# Patient Record
Sex: MALE | Race: WHITE | NOT HISPANIC OR LATINO | Employment: FULL TIME | URBAN - METROPOLITAN AREA
[De-identification: names, ages, dates, MRNs, and addresses within clinical notes are randomized per-mention and may not be internally consistent; named-entity substitution may affect disease eponyms.]

---

## 2017-06-21 ENCOUNTER — NON-PROVIDER VISIT (OUTPATIENT)
Dept: OCCUPATIONAL MEDICINE | Facility: CLINIC | Age: 52
End: 2017-06-21

## 2017-06-21 ENCOUNTER — OFFICE VISIT (OUTPATIENT)
Dept: OCCUPATIONAL MEDICINE | Facility: CLINIC | Age: 52
End: 2017-06-21

## 2017-06-21 VITALS
BODY MASS INDEX: 30.51 KG/M2 | OXYGEN SATURATION: 94 % | DIASTOLIC BLOOD PRESSURE: 84 MMHG | WEIGHT: 206 LBS | HEART RATE: 72 BPM | TEMPERATURE: 98.2 F | RESPIRATION RATE: 16 BRPM | SYSTOLIC BLOOD PRESSURE: 118 MMHG | HEIGHT: 69 IN

## 2017-06-21 DIAGNOSIS — Z02.1 PRE-EMPLOYMENT HEALTH SCREENING EXAMINATION: ICD-10-CM

## 2017-06-21 DIAGNOSIS — Z01.89 RESPIRATORY CLEARANCE EXAMINATION, ENCOUNTER FOR: ICD-10-CM

## 2017-06-21 PROCEDURE — 94010 BREATHING CAPACITY TEST: CPT | Performed by: PREVENTIVE MEDICINE

## 2017-06-21 PROCEDURE — 92553 AUDIOMETRY AIR & BONE: CPT | Performed by: PREVENTIVE MEDICINE

## 2017-06-21 PROCEDURE — 94375 RESPIRATORY FLOW VOLUME LOOP: CPT | Performed by: PREVENTIVE MEDICINE

## 2017-06-21 NOTE — MR AVS SNAPSHOT
Johan Vázquez   2017 9:40 AM   Office Visit   MRN: [id number]    Department:  [facility]   Dept Phone:  [phone]    Description:  Male : 1965   Provider:  Vipin Valiente D.O.           Allergies as of 2017     Allergen Noted Reactions    Latex 2013         You were diagnosed with     Respiratory clearance examination, encounter for   [[id number]]         Vital Signs     Smoking Status                   Former Smoker           Basic Information     Date Of Birth Sex Race Ethnicity Preferred Language    1965 Male White Non- English      Problem List              ICD-10-CM Priority Class Noted - Resolved    Herpes genitalia A60.00   2013 - Present      Health Maintenance        Date Due Completion Dates    IMM DTaP/Tdap/Td Vaccine (1 - Tdap) 1983 ---    COLONOSCOPY 2026            Current Immunizations     No immunizations on file.      Below and/or attached are the medications your provider expects you to take. Review all of your home medications and newly ordered medications with your provider and/or pharmacist. Follow medication instructions as directed by your provider and/or pharmacist. Please keep your medication list with you and share with your provider. Update the information when medications are discontinued, doses are changed, or new medications (including over-the-counter products) are added; and carry medication information at all times in the event of emergency situations     Allergies:  LATEX - (reactions not documented)               Medications  Valid as of: 2017 - 10:22 AM    Generic Name Brand Name Tablet Size Instructions for use    ValACYclovir HCl (Tab) VALTREX 1 GM Take 1 Tab by mouth every day.        .                 Medicines prescribed today were sent to:     [id number] - [place], [place] - [address]    [address] [place] [address]    Phone: [phone] Fax:  [phone]    Open 24 Hours?: No      Medication refill instructions:       If your prescription bottle indicates you have medication refills left, it is not necessary to call your provider’s office. Please contact your pharmacy and they will refill your medication.    If your prescription bottle indicates you do not have any refills left, you may request refills at any time through one of the following ways: The online SoWeTrip system (except Urgent Care), by calling your provider’s office, or by asking your pharmacy to contact your provider’s office with a refill request. Medication refills are processed only during regular business hours and may not be available until the next business day. Your provider may request additional information or to have a follow-up visit with you prior to refilling your medication.   *Please Note: Medication refills are assigned a new Rx number when refilled electronically. Your pharmacy may indicate that no refills were authorized even though a new prescription for the same medication is available at the pharmacy. Please request the medicine by name with the pharmacy before contacting your provider for a refill.           SoWeTrip Access Code: Activation code not generated  Current SoWeTrip Status: Active

## 2017-10-16 ENCOUNTER — IMMUNIZATION (OUTPATIENT)
Dept: OCCUPATIONAL MEDICINE | Facility: CLINIC | Age: 52
End: 2017-10-16

## 2017-10-16 DIAGNOSIS — Z23 NEED FOR VACCINATION: ICD-10-CM

## 2017-10-16 PROCEDURE — 90686 IIV4 VACC NO PRSV 0.5 ML IM: CPT | Performed by: PREVENTIVE MEDICINE

## 2018-01-15 ENCOUNTER — HOSPITAL ENCOUNTER (EMERGENCY)
Facility: MEDICAL CENTER | Age: 53
End: 2018-01-15
Attending: EMERGENCY MEDICINE
Payer: COMMERCIAL

## 2018-01-15 ENCOUNTER — APPOINTMENT (OUTPATIENT)
Dept: RADIOLOGY | Facility: MEDICAL CENTER | Age: 53
End: 2018-01-15
Attending: EMERGENCY MEDICINE
Payer: COMMERCIAL

## 2018-01-15 VITALS
HEART RATE: 72 BPM | TEMPERATURE: 96.7 F | SYSTOLIC BLOOD PRESSURE: 137 MMHG | BODY MASS INDEX: 31.31 KG/M2 | RESPIRATION RATE: 18 BRPM | WEIGHT: 211.42 LBS | HEIGHT: 69 IN | OXYGEN SATURATION: 96 % | DIASTOLIC BLOOD PRESSURE: 78 MMHG

## 2018-01-15 DIAGNOSIS — S61.312A LACERATION OF RIGHT MIDDLE FINGER WITHOUT FOREIGN BODY WITH DAMAGE TO NAIL, INITIAL ENCOUNTER: ICD-10-CM

## 2018-01-15 DIAGNOSIS — S62.662B OPEN NONDISPLACED FRACTURE OF DISTAL PHALANX OF RIGHT MIDDLE FINGER, INITIAL ENCOUNTER: ICD-10-CM

## 2018-01-15 PROCEDURE — 90715 TDAP VACCINE 7 YRS/> IM: CPT | Performed by: EMERGENCY MEDICINE

## 2018-01-15 PROCEDURE — 303747 HCHG EXTRA SUTURE

## 2018-01-15 PROCEDURE — 73130 X-RAY EXAM OF HAND: CPT | Mod: RT

## 2018-01-15 PROCEDURE — 90471 IMMUNIZATION ADMIN: CPT

## 2018-01-15 PROCEDURE — 303485 HCHG DRESSING MEDIUM

## 2018-01-15 PROCEDURE — 304217 HCHG IRRIGATION SYSTEM

## 2018-01-15 PROCEDURE — 99284 EMERGENCY DEPT VISIT MOD MDM: CPT

## 2018-01-15 PROCEDURE — 700111 HCHG RX REV CODE 636 W/ 250 OVERRIDE (IP): Performed by: EMERGENCY MEDICINE

## 2018-01-15 PROCEDURE — 304999 HCHG REPAIR-SIMPLE/INTERMED LEVEL 1

## 2018-01-15 RX ORDER — KETOROLAC TROMETHAMINE 10 MG/1
10 TABLET, FILM COATED ORAL EVERY 6 HOURS PRN
Qty: 22 TAB | Refills: 2 | Status: SHIPPED | OUTPATIENT
Start: 2018-01-15

## 2018-01-15 RX ADMIN — CLOSTRIDIUM TETANI TOXOID ANTIGEN (FORMALDEHYDE INACTIVATED), CORYNEBACTERIUM DIPHTHERIAE TOXOID ANTIGEN (FORMALDEHYDE INACTIVATED), BORDETELLA PERTUSSIS TOXOID ANTIGEN (GLUTARALDEHYDE INACTIVATED), BORDETELLA PERTUSSIS FILAMENTOUS HEMAGGLUTININ ANTIGEN (FORMALDEHYDE INACTIVATED), BORDETELLA PERTUSSIS PERTACTIN ANTIGEN, AND BORDETELLA PERTUSSIS FIMBRIAE 2/3 ANTIGEN 0.5 ML: 5; 2; 2.5; 5; 3; 5 INJECTION, SUSPENSION INTRAMUSCULAR at 08:15

## 2018-01-15 ASSESSMENT — PAIN SCALES - GENERAL: PAINLEVEL_OUTOF10: 5

## 2018-01-15 NOTE — DISCHARGE INSTRUCTIONS
Finger Fracture  Fractures of fingers are breaks in the bones of the fingers. There are many types of fractures. There are different ways of treating these fractures. Your health care provider will discuss the best way to treat your fracture.  CAUSES  Traumatic injury is the main cause of broken fingers. These include:  · Injuries while playing sports.  · Workplace injuries.  · Falls.  RISK FACTORS  Activities that can increase your risk of finger fractures include:  · Sports.  · Workplace activities that involve machinery.  · A condition called osteoporosis, which can make your bones less dense and cause them to fracture more easily.  SIGNS AND SYMPTOMS  The main symptoms of a broken finger are pain and swelling within 15 minutes after the injury. Other symptoms include:  · Bruising of your finger.  · Stiffness of your finger.  · Numbness of your finger.  · Exposed bones (compound fracture) if the fracture is severe.  DIAGNOSIS   The best way to diagnose a broken bone is with X-ray imaging. Additionally, your health care provider will use this X-ray image to evaluate the position of the broken finger bones.   TREATMENT   Finger fractures can be treated with:   · Nonreduction--This means the bones are in place. The finger is splinted without changing the positions of the bone pieces. The splint is usually left on for about a week to 10 days. This will depend on your fracture and what your health care provider thinks.  · Closed reduction--The bones are put back into position without using surgery. The finger is then splinted.  · Open reduction and internal fixation--The fracture site is opened. Then the bone pieces are fixed into place with pins or some type of hardware. This is seldom required. It depends on the severity of the fracture.  HOME CARE INSTRUCTIONS   · Follow your health care provider's instructions regarding activities, exercises, and physical therapy.  · Only take over-the-counter or prescription  medicines for pain, discomfort, or fever as directed by your health care provider.  SEEK MEDICAL CARE IF:  You have pain or swelling that limits the motion or use of your fingers.  SEEK IMMEDIATE MEDICAL CARE IF:   Your finger becomes numb.  MAKE SURE YOU:   · Understand these instructions.  · Will watch your condition.  · Will get help right away if you are not doing well or get worse.     This information is not intended to replace advice given to you by your health care provider. Make sure you discuss any questions you have with your health care provider.     Document Released: 04/01/2002 Document Revised: 10/08/2014 Document Reviewed: 07/30/2014  [employer] Interactive Patient Education ©2016 [employer]    Fingertip Laceration  The treatment of fingertip injuries depends on how large the cut is and whether the bone or nail tissue has been damaged. Amputations of the skin over the tip of the finger that is smaller than a dime (smaller than 1cm) will usually heal very well from the sides without any treatment other than cleaning the wound and changing the dressing.  Keep your hand elevated for the next 2 to 3 days to reduce pain and swelling. A splint over the fingertip may be needed to protect your injury. If your cut is being allowed to heal in from the sides, you should soak it in warm water and change the dressing daily.   You may need a tetanus shot if:  · You cannot remember when you had your last tetanus shot.  · You have never had a tetanus shot.  · The injury broke your skin.  If you got a tetanus shot, your arm may swell, get red, and feel warm to the touch. This is common and not a problem. If you need a tetanus shot and you choose not to have one, there is a rare chance of getting tetanus. Sickness from tetanus can be serious.  SEEK MEDICAL CARE IF:   · There are any signs of infection: increased redness, swelling, and pain, or sometimes pus drainage.  Document Released: 01/25/2006 Document Revised:  03/11/2013 Document Reviewed: 01/21/2010  [employer]® Patient Information ©2014 [employer].  Return if fever, vomiting or if no better in 12 hours.

## 2018-01-15 NOTE — ED NOTES
51 y/o male ambulatory to triage with c/o partial amputation to the tip of his right middle finger after getting his finger slammed in a door.

## 2018-01-15 NOTE — LETTER
"  FORM C-4:  EMPLOYEE’S CLAIM FOR COMPENSATION/ REPORT OF INITIAL TREATMENT  EMPLOYEE’S CLAIM - PROVIDE ALL INFORMATION REQUESTED   First Name  Johan Last Syeda Vázquez Birthdate             Age  1965 52 y.o. Sex  male Claim Number   Home Employee Address  [address]   [facility]                                     Zip  [address] Height  1.753 m (5' 9\") Weight  95.9 kg (211 lb 6.7 oz) [facility]     Mailing Employee Address                           [address]    [facility]               Zip  [address] Telephone  [phone] (home)  Primary Language Spoken  ENGLISH   Insurer  [employer]   Third Party   [employer] Employee's Occupation (Job Title) When Injury or Occupational Disease Occurred  [employer]   Employer's Name  [employer] Telephone  [phone]    Employer Address  [address]] Zip  [address]   Date of Injury  1/15/2018       Hour of Injury  7:15 AM Date Employer Notified  1/15/2018 Last Day of Work after Injury or Occupational Disease  1/15/2018 Supervisor to Whom Injury Reported  Helder Rivers   Address or Location of Accident (if applicable)  [[address]]   What were you doing at the time of accident? (if applicable)  Shutting double doors to a paint mckeon    How did this injury or occupational disease occur? Be specific and answer in detail. Use additional sheet if necessary)  Shutting double doors to a paint mckeon & cut the tip of R hand middle finger   If you believe that you have an occupational disease, when did you first have knowledge of the disability and it relationship to your employment?  n/a Witnesses to the Accident  n/a     Nature of Injury or Occupational Disease  Workers' Compensation  Part(s) of Body Injured or Affected  Finger (R), N/A, N/A    I certify that the above is true and correct to the best of my " knowledge and that I have provided this information in order to obtain the benefits of [place] or [place]  I hereby authorize any physician, chiropractor, surgeon, practitioner, or other person, any hospital, including [facility] or [facility], any medical service organization, any insurance company, or other institution or organization to release to each other, any medical or other information, including benefits paid or payable, pertinent to this injury or disease, except information relative to diagnosis, treatment and/or counseling for AIDS, psychological conditions, alcohol or controlled substances, for which I must give specific authorization.  A Photostat of this authorization shall be as valid as the original.   Date  01/15/2017 [facility]   Employee’s Signature   THIS REPORT MUST BE COMPLETED AND MAILED WITHIN 3 WORKING DAYS OF TREATMENT   Place  [facility], EMERGENCY DEPT  Name of Facility   [facility]   Date  1/15/2018 Diagnosis  (S61.312A) Laceration of right middle finger without foreign body with damage to nail, initial encounter  (S62.662B) Open nondisplaced fracture of distal phalanx of right middle finger, initial encounter Is there evidence the injured employee was under the influence of alcohol and/or another controlled substance at the time of accident?   Hour  9:43 AM Description of Injury or Disease  Laceration of right middle finger without foreign body with damage to nail, initial encounter  Open nondisplaced fracture of distal phalanx of right middle finger, initial encounter No   Treatment  Fingertip laceration, open fracture laceration sutured in the emergency department  Have you advised the patient to remain off work five days or more?         No   X-Ray Findings  Positive  Comments:fractured distal portion  "of distal phalanx right middle finger   If Yes   From Date    To Date      From information given by the employee, together with medical evidence, can you directly connect this injury or occupational disease as job incurred?  Yes If No, is the employee capable of: Full Duty  No Modified Duty  Yes   Is additional medical care by a physician indicated?  No If Modified Duty, Specify any Limitations / Restrictions  Elevate right hand     Do you know of any previous injury or disease contributing to this condition or occupational disease?  No   Date  1/15/2018 Print Doctor’s Name  Genaro Negro I certify the employer’s copy of this form was mailed on:   Address  [address] [place] [address] [phone] Insurer’s Use Only   [facility]  [address]    Provider’s Tax ID Number    Telephone  Dept: [phone]    Doctor’s Signature  e-SignGENARO NEGRO M.D. Degree   M.D.    Original - TREATING PHYSICIAN OR CHIROPRACTOR   Pg 2-Insurer/TPA   Pg 3-Employer   Pg 4-Employee                                                                                                  Form C-4 (rev01/03)     BRIEF DESCRIPTION OF RIGHTS AND BENEFITS  (Pursuant to [place] 616C.050)    Notice of Injury or Occupational Disease (Incident Report Form C-1): If an injury or occupational disease (OD) arises out of and in the course of employment, you must provide written notice to your employer as soon as practicable, but no later than 7 days after the accident or OD. Your employer shall maintain a sufficient supply of the required forms.    Claim for Compensation (Form C-4): If medical treatment is sought, the form C-4 is available at the place of initial treatment. A completed \"Claim for Compensation\" (Form C-4) must be filed within 90 days after an accident or OD. The treating physician or chiropractor must, within 3 working days after treatment, complete and mail to the employer, the employer's insurer and third-party " , the Claim for Compensation.    Medical Treatment: If you require medical treatment for your on-the-job injury or OD, you may be required to select a physician or chiropractor from a list provided by your workers’ compensation insurer, if it has contracted with an Organization for Managed Care (MCO) or Preferred Provider Organization (PPO) or providers of health care. If your employer has not entered into a contract with an MCO or PPO, you may select a physician or chiropractor from the Panel of Physicians and Chiropractors. Any medical costs related to your industrial injury or OD will be paid by your insurer.    Temporary Total Disability (TTD): If your doctor has certified that you are unable to work for a period of at least 5 consecutive days, or 5 cumulative days in a 20-day period, or places restrictions on you that your employer does not accommodate, you may be entitled to TTD compensation.    Temporary Partial Disability (TPD): If the wage you receive upon reemployment is less than the compensation for TTD to which you are entitled, the insurer may be required to pay you TPD compensation to make up the difference. TPD can only be paid for a maximum of 24 months.    Permanent Partial Disability (PPD): When your medical condition is stable and there is an indication of a PPD as a result of your injury or OD, within 30 days, your insurer must arrange for an evaluation by a rating physician or chiropractor to determine the degree of your PPD. The amount of your PPD award depends on the date of injury, the results of the PPD evaluation and your age and wage.    Permanent Total Disability (PTD): If you are medically certified by a treating physician or chiropractor as permanently and totally disabled and have been granted a PTD status by your insurer, you are entitled to receive monthly benefits not to exceed 66 2/3% of your average monthly wage. The amount of your PTD payments is subject to  reduction if you previously received a PPD award.    Vocational Rehabilitation Services: You may be eligible for vocational rehabilitation services if you are unable to return to the job due to a permanent physical impairment or permanent restrictions as a result of your injury or occupational disease.    Transportation and Per Meli Reimbursement: You may be eligible for travel expenses and per meli associated with medical treatment.  Reopening: You may be able to reopen your claim if your condition worsens after claim closure.    Appeal Process: If you disagree with a written determination issued by the insurer or the insurer does not respond to your request, you may appeal to the Department of Administration, , by following the instructions contained in your determination letter. You must appeal the determination within 70 days from the date of the determination letter at [address], [address], [place], [place] [address], or [address], [place] [address]. If you disagree with the  decision, you may appeal to the Department of Administration, . You must file your appeal within 30 days from the date of the  decision letter at [address], [address], [place], [place] [address], or [address], [address], [place], [place] [address]. If you disagree with a decision of an , you may file a petition for judicial review with the District Court. You must do so within 30 days of the Appeal Officer’s decision. You may be represented by an  at your own expense or you may contact the [facility] for possible representation.    [place]  for Injured Workers (NAIW): If you disagree with a  decision, you may request that NAIW represent you without charge at an  Hearing. For information regarding denial of benefits, you may contact the [facility] at: [address],  [address], [place] [address], [phone], or [address], [address], [place] [address], [phone]    To File a Complaint with the Division: If you wish to file a complaint with the  of the Division of Industrial Relations (DIR), please contact the Workers’ Compensation Section, [address], [address], [place], [place] [address], telephone [phone]) [phone], or [address], [address], [place], [place] [address], telephone [phone].    For assistance with Workers’ Compensation Issues: you may contact the Office of the Governor Consumer Health Assistance, [address] [place] [address], [address], [place], [place] [address], Toll Free 1-531.511.3029, Web site: http://[employer].[facility]., E-mail [email or web address].[place].                                                                                                                                                                               __________________________________________________________________                                    _________________            Employee Name / Signature                                                                                                                            Date                                       D-2 (rev. 10/07)

## 2018-01-15 NOTE — ED PROVIDER NOTES
"ED Provider Note    CHIEF COMPLAINT  Chief Complaint   Patient presents with   • Amputation     partial amputation of right middle finger   • Hand Injury       HPI  Johan Vázquez is a 52 y.o. male who presents with history of accidentally slamming a door on his right middle finger, causing lacerations to the finger, no other injuries.. Injury happened at work    REVIEW OF SYSTEMS  See HPI for further details. All other systems are negative.     PAST MEDICAL HISTORY  Past Medical History:   Diagnosis Date   • Genital herpes        FAMILY HISTORY  Family History   Problem Relation Age of Onset   • Heart Disease Mother    • Arthritis Father    • Heart Disease Father    • Hypertension Maternal Grandfather    • Heart Disease Maternal Grandfather        SOCIAL HISTORY the injury occurred at work  Social History     Social History   • Marital status:      Spouse name: N/A   • Number of children: N/A   • Years of education: N/A     Social History Main Topics   • Smoking status: Former Smoker   • Smokeless tobacco: Not on file   • Alcohol use 0.5 oz/week     1 Glasses of wine per week      Comment: week   • Drug use: No   • Sexual activity: Not Currently     Other Topics Concern   • Not on file     Social History Narrative   • No narrative on file       SURGICAL HISTORY  No past surgical history on file.    CURRENT MEDICATIONS  Home Medications     Reviewed by Katlin Quiñonez R.N. (Registered Nurse) on 01/15/18 at 0753  Med List Status: Partial   Medication Last Dose Status   Celecoxib (CELEBREX PO) prn Active   valacyclovir (VALTREX) 1 GM Tab prn Active                ALLERGIES  Allergies   Allergen Reactions   • Latex Hives       PHYSICAL EXAM  VITAL SIGNS: /100   Pulse 90   Temp 35.9 °C (96.7 °F)   Resp 16   Ht 1.753 m (5' 9\")   Wt 95.9 kg (211 lb 6.7 oz)   SpO2 96%   BMI 31.22 kg/m²   Constitutional: Well developed, Well nourished, No acute distress, Non-toxic appearance.   Extremities: Intact distal " pulses, right middle finger, laceration, 2 cm tip does not appear to involve bone. There is a subungual hematoma in addition, No cyanosis, No clubbing. , Neurovascular intact, good sensation tip of finger  Musculoskeletal: Good range of motion in all major joints. No tenderness to palpation or major deformities noted.   Neurologic: Alert & oriented x 3, Normal motor function, Normal sensory function, No focal deficits noted.   Psychiatric: Affect normal, Judgment normal, Mood normal.       RADIOLOGY/PROCEDURES  DX-HAND 3+ RIGHT   Final Result      Comminuted and displaced fracture at the tip of RIGHT 3rd tuft with associated soft tissue deformity and potential involvement of the nailbed.  Probable open fracture.            COURSE & MEDICAL DECISION MAKING  Pertinent Labs & Imaging studies reviewed. (See chart for details)    He has a laceration, tip of right middle finger with comminuted fracture of the distal phalanx. The laceration is 2 cm in length. About 270° around the tip of the finger. I have sutured the laceration, he will all with Toradol, follow-up with occupational clinic. I have filled out forms for on-the-job injury  FINAL IMPRESSION  1.   1. Laceration of right middle finger without foreign body with damage to nail, initial encounter    2. Open nondisplaced fracture of distal phalanx of right middle finger, initial encounter            2.   3.     Disposition procedure note: This laceration was preped and then anesthetized with 1% Xylocaine. The laceration was thoroughly irrigated with normal saline. I was unable to find ayn foreign bodies int the wound. Any hair around the wound was shaved. The laceration was then sutured with 4-0 nylon. Neosporin was then placed around the wound and a dressing was applied.    Discharge instructions are understood. This patient is to return if fever vomiting or no better in 12 hours. Follow up with the occupational clinic associated with this hospital. Information  sheets on finger laceration on the finger fracture      Electronically signed by: Genaro Negro, 1/15/2018 8:07 AM

## 2018-01-16 ENCOUNTER — OCCUPATIONAL MEDICINE (OUTPATIENT)
Dept: OCCUPATIONAL MEDICINE | Facility: CLINIC | Age: 53
End: 2018-01-16
Payer: COMMERCIAL

## 2018-01-16 VITALS
HEIGHT: 69 IN | WEIGHT: 230 LBS | TEMPERATURE: 98.2 F | DIASTOLIC BLOOD PRESSURE: 80 MMHG | OXYGEN SATURATION: 96 % | HEART RATE: 79 BPM | BODY MASS INDEX: 34.07 KG/M2 | SYSTOLIC BLOOD PRESSURE: 128 MMHG | RESPIRATION RATE: 16 BRPM

## 2018-01-16 DIAGNOSIS — Z02.83 ENCOUNTER FOR DRUG SCREENING: ICD-10-CM

## 2018-01-16 DIAGNOSIS — S62.632D DISPLACED FRACTURE OF DISTAL PHALANX OF RIGHT MIDDLE FINGER, SUBSEQUENT ENCOUNTER FOR FRACTURE WITH ROUTINE HEALING: ICD-10-CM

## 2018-01-16 DIAGNOSIS — S61.312D LACERATION OF RIGHT MIDDLE FINGER WITHOUT FOREIGN BODY WITH DAMAGE TO NAIL, SUBSEQUENT ENCOUNTER: ICD-10-CM

## 2018-01-16 PROCEDURE — 99026 IN-HOSPITAL ON CALL SERVICE: CPT | Performed by: PREVENTIVE MEDICINE

## 2018-01-16 PROCEDURE — 99214 OFFICE O/P EST MOD 30 MIN: CPT | Performed by: PREVENTIVE MEDICINE

## 2018-01-16 PROCEDURE — 82075 ASSAY OF BREATH ETHANOL: CPT | Performed by: PREVENTIVE MEDICINE

## 2018-01-16 RX ORDER — AMOXICILLIN AND CLAVULANATE POTASSIUM 875; 125 MG/1; MG/1
1 TABLET, FILM COATED ORAL 2 TIMES DAILY
Qty: 20 QUANTITY SUFFICIENT | Refills: 0 | Status: SHIPPED | OUTPATIENT
Start: 2018-01-16

## 2018-01-16 NOTE — PROGRESS NOTES
"Subjective:      Johan Vázquez is a 52 y.o. male who presents with Follow-Up (WC DOI 1/16/18 Rt middle finger injury feeling better pr 1)      DOI 1/15/2018. Mechanism of injury-shunt door on tip of right long finger. 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 He was seen in the emergency department yesterday where sutures were placed. Pain complaints are minimal. No fevers, chills, or other constitutional symptoms.     HPI    ROS  Comprehensive medical history form reviewed. Pertinent positives and negatives included in HPI.    PFSH: reviewed in Epic    PMH:  has a past medical history of Genital herpes.  MEDS:   Current Outpatient Prescriptions:   •  amoxicillin-clavulanate (AUGMENTIN) 875-125 MG Tab, Take 1 Tab by mouth 2 times a day., Disp: 20 Quantity Sufficient, Rfl: 0  •  ketorolac (TORADOL) 10 MG Tab, Take 1 Tab by mouth every 6 hours as needed for Mild Pain for up to 22 doses., Disp: 22 Tab, Rfl: 2  •  Celecoxib (CELEBREX PO), Take  by mouth., Disp: , Rfl:   •  valacyclovir (VALTREX) 1 GM Tab, Take 1 Tab by mouth every day., Disp: 30 Tab, Rfl: 6  ALLERGIES:   Allergies   Allergen Reactions   • Latex Hives     SURGHX: No past surgical history on file.  SOCHX:  reports that he has quit smoking. He does not have any smokeless tobacco history on file. He reports that he drinks about 0.5 oz of alcohol per week . He reports that he does not use drugs.  Work Status: Employer and Job Title reviewed per [place] C4 form  FH: No pertinent hereditary disorders.        Objective:     /80   Pulse 79   Temp 36.8 °C (98.2 °F)   Resp 16   Ht 1.753 m (5' 9\")   Wt 104.3 kg (230 lb)   SpO2 96%   BMI 33.97 kg/m²      Physical Exam    Appearance: Well-developed, well-nourished.   Mental Status: Mood and Affect normal. Pleasant. Cooperative. Appropriate.   ENT: Oropharynx clear. Moist mucous membranes. Hearing normal.   Eyes: Pupils reactive. Conjunctiva normal. No scleral " icterus.   Neck: Trachea Midline. No thyromegaly. No masses.  Cardiovascular: Normal rate. Regular rhythm. Normal heart sounds.   Chest: Effort normal. Breath sounds clear.   Skin: Skin is warm and dry. No rash.   Musculoskeletal: Right long finger exam shows near circumferential laceration on the volar aspect of the digit had minimal soft tissue involvement. Although some parts appear nonviable, there is distal capillary refill that appears satisfactory. Subungual hematoma is present.         Assessment/Plan:     1. Laceration of right middle finger without foreign body with damage to nail, subsequent encounter  2. Displaced fracture of distal phalanx of right middle finger, subsequent encounter for fracture with routine healing  New to Occupational Health from emergency department  - amoxicillin-clavulanate (AUGMENTIN) 875-125 MG Tab; Take 1 Tab by mouth 2 times a day.  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

## 2018-01-16 NOTE — LETTER
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  Diagnosis: Diagnoses of Laceration of right middle finger without foreign body with damage to nail, subsequent encounter and Displaced fracture of distal phalanx of right middle finger, subsequent encounter for fracture with routine healing were pertinent to this visit.    Medical Information   Related to Industrial Injury? Yes    Subjective Complaints:  DOI 1/15/2018. Mechanism of injury-shunt door on tip of right long finger. 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 He was seen in the emergency department yesterday where sutures were placed. Pain complaints are minimal. No fevers, chills, or other constitutional symptoms.   Objective Findings: Appearance: Well-developed, well-nourished.   Mental Status: Mood and Affect normal. Pleasant. Cooperative. Appropriate.   ENT: Oropharynx clear. Moist mucous membranes. Hearing normal.   Eyes: Pupils reactive. Conjunctiva normal. No scleral icterus.   Neck: Trachea Midline. No thyromegaly. No masses.  Cardiovascular: Normal rate. Regular rhythm. Normal heart sounds.   Chest: Effort normal. Breath sounds clear.   Skin: Skin is warm and dry. No rash.   Musculoskeletal: Right long finger exam shows near circumferential laceration on the volar aspect of the digit had minimal soft tissue involvement. Although some parts appear nonviable, there is distal capillary refill that appears satisfactory. Subungual  hematoma is present.     Pre-Existing Condition(s):     Assessment:   Condition Improved    Status: Additional Care Required  Permanent Disability:No    Plan: Medication    Diagnostics:      Comments:       Disability Information   Status: Released to Full Duty    From:  1/16/2018  Through: 1/26/2018 Restrictions are:     Physical Restrictions   Sitting:    Standing:    Stooping:    Bending:      Squatting:    Walking:    Climbing:    Pushing:      Pulling:    Other:    Reaching Above Shoulder (L):   Reaching Above Shoulder (R):       Reaching Below Shoulder (L):    Reaching Below Shoulder (R):      Not to exceed Weight Limits   Carrying(hrs):   Weight Limit(lb):   Lifting(hrs):   Weight  Limit(lb):     Comments: Keep clean and dry. Splint as needed.    Repetitive Actions   Hands: i.e. Fine Manipulations from Grasping:     Feet: i.e. Operating Foot Controls:     Driving / Operate Machinery:     Physician Name: Main Dos Santos M.D. Physician Signature: MAIN Camp M.D. e-Signature: Dr. López Lopez, Medical Director   Clinic Name / Location: [address],   [address] [place] [address] Clinic Phone Number: Dept: [phone]   Appointment Time: 1:50 Pm Visit Start Time: 1:34 PM   Check-In Time:  1:27 Pm Visit Discharge Time:  2:54PM    Original-Treating Physician or Chiropractor    Page 2-Insurer/TPA    Page 3-Employer    Page 4-Employee

## 2018-01-26 ENCOUNTER — OCCUPATIONAL MEDICINE (OUTPATIENT)
Dept: OCCUPATIONAL MEDICINE | Facility: CLINIC | Age: 53
End: 2018-01-26
Payer: COMMERCIAL

## 2018-01-26 VITALS
HEIGHT: 69 IN | DIASTOLIC BLOOD PRESSURE: 92 MMHG | WEIGHT: 230 LBS | OXYGEN SATURATION: 100 % | HEART RATE: 69 BPM | RESPIRATION RATE: 14 BRPM | SYSTOLIC BLOOD PRESSURE: 130 MMHG | BODY MASS INDEX: 34.07 KG/M2

## 2018-01-26 DIAGNOSIS — S61.312D LACERATION OF RIGHT MIDDLE FINGER WITHOUT FOREIGN BODY WITH DAMAGE TO NAIL, SUBSEQUENT ENCOUNTER: ICD-10-CM

## 2018-01-26 DIAGNOSIS — S62.632D DISPLACED FRACTURE OF DISTAL PHALANX OF RIGHT MIDDLE FINGER, SUBSEQUENT ENCOUNTER FOR FRACTURE WITH ROUTINE HEALING: ICD-10-CM

## 2018-01-26 PROCEDURE — 99212 OFFICE O/P EST SF 10 MIN: CPT | Performed by: PREVENTIVE MEDICINE

## 2018-01-26 ASSESSMENT — PAIN SCALES - GENERAL: PAINLEVEL: 2=MINIMAL-SLIGHT

## 2018-01-26 NOTE — LETTER
[address],   [address] [place] [address]  Phone:  [phone] - Fax:  [phone]   [facility] Progress Report and Disability Certification  Date of Service: 1/26/2018   No Show:  No  Date / Time of Next Visit: 2/9/2018 @ 10:10 AM    Claim Information   Patient Name: Johan Vázquez  Claim Number:     Employer: [employer] OTHER SERVICES  Date of Injury: 1/15/2018     Insurer / TPA: [employer]  ID / SSN:     Occupation: [employer].  Diagnosis: Diagnoses of Laceration of right middle finger without foreign body with damage to nail, subsequent encounter and Displaced fracture of distal phalanx of right middle finger, subsequent encounter for fracture with routine healing were pertinent to this visit.    Medical Information   Related to Industrial Injury? Yes    Subjective Complaints:  DOI 1/15/2018. Mechanism of injury-shunt door on tip of right long finger. 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   Objective Findings: Right long finger shows interval healing. Some nonviable epidermis on the pad of the digit.   Pre-Existing Condition(s):     Assessment:   Condition Improved    Status: Additional Care Required  Permanent Disability:No    Plan:      Diagnostics:      Comments:       Disability Information   Status: Released to Full Duty    From:  1/26/2018  Through: 2/9/2018 Restrictions are:     Physical Restrictions   Sitting:    Standing:    Stooping:    Bending:      Squatting:    Walking:    Climbing:    Pushing:      Pulling:    Other:    Reaching Above Shoulder (L):   Reaching Above Shoulder (R):       Reaching Below Shoulder (L):    Reaching Below Shoulder (R):      Not to exceed Weight Limits   Carrying(hrs):   Weight Limit(lb):   Lifting(hrs):   Weight  Limit(lb):     Comments: Activity as tolerated    Repetitive Actions   Hands: i.e. Fine Manipulations from  Grasping:     Feet: i.e. Operating Foot Controls:     Driving / Operate Machinery:     Physician Name: Main Dos Santos M.D. Physician Signature: MAIN Camp M.D. e-Signature: Dr. López Lopez, Medical Director   Clinic Name / Location: [address],   [address]  [place] [address] Clinic Phone Number: Dept: [phone]   Appointment Time: 10:15 Am Visit Start Time: 10:34 AM   Check-In Time:  10:11 Am Visit Discharge Time:  10:53 AM    Original-Treating Physician or Chiropractor    Page 2-Insurer/TPA    Page 3-Employer    Page 4-Employee

## 2018-01-26 NOTE — PROGRESS NOTES
"Subjective:      Johan Vázquez is a 52 y.o. male who presents with Follow-Up (WC DOI 1/15/2018 - R FINGER - Better - ROOM 3)      DOI 1/15/2018. Mechanism of injury-shunt door on tip of right long finger. 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     HPI    ROS  PFSH:  WORK STATUS: Activity as tolerated  PMH:  has a past medical history of Genital herpes.  MEDS:   Current Outpatient Prescriptions:   •  amoxicillin-clavulanate (AUGMENTIN) 875-125 MG Tab, Take 1 Tab by mouth 2 times a day., Disp: 20 Quantity Sufficient, Rfl: 0  •  Celecoxib (CELEBREX PO), Take  by mouth., Disp: , Rfl:   •  ketorolac (TORADOL) 10 MG Tab, Take 1 Tab by mouth every 6 hours as needed for Mild Pain for up to 22 doses., Disp: 22 Tab, Rfl: 2  •  valacyclovir (VALTREX) 1 GM Tab, Take 1 Tab by mouth every day., Disp: 30 Tab, Rfl: 6       Objective:     /92   Pulse 69   Resp 14   Ht 1.753 m (5' 9\")   Wt 104.3 kg (230 lb)   SpO2 100%   BMI 33.97 kg/m²      Physical Exam    Right long finger shows interval healing. Some nonviable epidermis on the pad of the digit.       Assessment/Plan:     1. Laceration of right middle finger without foreign body with damage to nail, subsequent encounter  2. Displaced fracture of distal phalanx of right middle finger, subsequent encounter for fracture with routine healing  Suture removal   Activity as tolerated  Recheck in 2 weeks      "

## 2018-02-02 LAB
BREATH ALCOHOL COMMENT: NORMAL
POC BREATHALIZER: NORMAL PERCENT (ref 0–0.01)

## 2018-02-09 ENCOUNTER — OCCUPATIONAL MEDICINE (OUTPATIENT)
Dept: OCCUPATIONAL MEDICINE | Facility: CLINIC | Age: 53
End: 2018-02-09
Payer: COMMERCIAL

## 2018-02-09 VITALS
BODY MASS INDEX: 34.07 KG/M2 | TEMPERATURE: 98 F | RESPIRATION RATE: 14 BRPM | HEART RATE: 85 BPM | SYSTOLIC BLOOD PRESSURE: 124 MMHG | OXYGEN SATURATION: 100 % | DIASTOLIC BLOOD PRESSURE: 74 MMHG | HEIGHT: 69 IN | WEIGHT: 230 LBS

## 2018-02-09 DIAGNOSIS — S62.632D DISPLACED FRACTURE OF DISTAL PHALANX OF RIGHT MIDDLE FINGER, SUBSEQUENT ENCOUNTER FOR FRACTURE WITH ROUTINE HEALING: ICD-10-CM

## 2018-02-09 DIAGNOSIS — S61.312D LACERATION OF RIGHT MIDDLE FINGER WITHOUT FOREIGN BODY WITH DAMAGE TO NAIL, SUBSEQUENT ENCOUNTER: ICD-10-CM

## 2018-02-09 PROCEDURE — 99212 OFFICE O/P EST SF 10 MIN: CPT | Performed by: PREVENTIVE MEDICINE

## 2018-02-09 ASSESSMENT — PAIN SCALES - GENERAL: PAINLEVEL: 1=MINIMAL PAIN

## 2018-02-09 NOTE — LETTER
[address],   [address] [place] [address]  Phone:  [phone] - Fax:  [phone]   [facility] Progress Report and Disability Certification  Date of Service: 2/9/2018   No Show:  No  Date / Time of Next Visit: 3/9/2018@9:30AM   Claim Information   Patient Name: Johan Vázquez  Claim Number:     Employer: [employer] OTHER SERVICES  Date of Injury: 1/15/2018     Insurer / TPA: [employer]  ID / SSN:     Occupation: [employer].  Diagnosis: Diagnoses of Laceration of right middle finger without foreign body with damage to nail, subsequent encounter and Displaced fracture of distal phalanx of right middle finger, subsequent encounter for fracture with routine healing were pertinent to this visit.    Medical Information   Related to Industrial Injury? Yes    Subjective Complaints:  DOI 1/15/2018. Mechanism of injury-shunt door on tip of right long finger. 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 He has no new complaints. Minimal pain.   Objective Findings: Right long finger shows interval improvement. Evidence of new plate formation. No excessive redness or drainage.   Pre-Existing Condition(s):     Assessment:   Condition Improved    Status: Additional Care Required  Permanent Disability:No    Plan:      Diagnostics:      Comments:       Disability Information   Status: Released to Full Duty    From:  2/9/2018  Through: 3/9/2018 Restrictions are:     Physical Restrictions   Sitting:    Standing:    Stooping:    Bending:      Squatting:    Walking:    Climbing:    Pushing:      Pulling:    Other:    Reaching Above Shoulder (L):   Reaching Above Shoulder (R):       Reaching Below Shoulder (L):    Reaching Below Shoulder (R):      Not to exceed Weight Limits   Carrying(hrs):   Weight Limit(lb):   Lifting(hrs):   Weight  Limit(lb):     Comments: Activity as tolerated       Repetitive Actions   Hands: i.e. Fine Manipulations from Grasping:     Feet: i.e. Operating Foot Controls:     Driving / Operate Machinery:     Physician Name: Main Dos Santos M.D. Physician Signature: MAIN Camp M.D. e-Signature: Dr. López Lopez, Medical Director   Clinic Name / Location: [address],   [address]  [place] [address] Clinic Phone Number: Dept: [phone]   Appointment Time: 10:10 Am Visit Start Time: 10:05 AM   Check-In Time:  10:01 Am Visit Discharge Time: 10:40AM    Original-Treating Physician or Chiropractor    Page 2-Insurer/TPA    Page 3-Employer    Page 4-Employee

## 2018-02-09 NOTE — PROGRESS NOTES
"Subjective:      Johan Vázquez is a 52 y.o. male who presents with Follow-Up (WC DOI 1/15/2018 - R FINGER - )      DOI 1/15/2018. Mechanism of injury-shunt door on tip of right long finger. 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 He has no new complaints. Minimal pain.     HPI    ROS  PFSH:  WORK STATUS: Full duty  PMH:  has a past medical history of Genital herpes.  MEDS:   Current Outpatient Prescriptions:   •  amoxicillin-clavulanate (AUGMENTIN) 875-125 MG Tab, Take 1 Tab by mouth 2 times a day., Disp: 20 Quantity Sufficient, Rfl: 0  •  Celecoxib (CELEBREX PO), Take  by mouth., Disp: , Rfl:   •  ketorolac (TORADOL) 10 MG Tab, Take 1 Tab by mouth every 6 hours as needed for Mild Pain for up to 22 doses., Disp: 22 Tab, Rfl: 2  •  valacyclovir (VALTREX) 1 GM Tab, Take 1 Tab by mouth every day., Disp: 30 Tab, Rfl: 6       Objective:     /74   Pulse 85   Temp 36.7 °C (98 °F)   Resp 14   Ht 1.753 m (5' 9\")   Wt 104.3 kg (230 lb)   SpO2 100%   BMI 33.97 kg/m²      Physical Exam    Right long finger shows interval improvement. Evidence of new plate formation. No excessive redness or drainage.       Assessment/Plan:     1. Laceration of right middle finger without foreign body with damage to nail, subsequent encounter  2. Displaced fracture of distal phalanx of right middle finger, subsequent encounter for fracture with routine healing  Satisfactory interval improvement  Regular activity as tolerated  Recheck one month from discharge      "

## 2018-03-09 ENCOUNTER — OCCUPATIONAL MEDICINE (OUTPATIENT)
Dept: OCCUPATIONAL MEDICINE | Facility: CLINIC | Age: 53
End: 2018-03-09
Payer: COMMERCIAL

## 2018-03-09 VITALS
DIASTOLIC BLOOD PRESSURE: 84 MMHG | TEMPERATURE: 98.4 F | SYSTOLIC BLOOD PRESSURE: 142 MMHG | HEIGHT: 69 IN | HEART RATE: 66 BPM | BODY MASS INDEX: 34.07 KG/M2 | WEIGHT: 230 LBS | RESPIRATION RATE: 12 BRPM | OXYGEN SATURATION: 98 %

## 2018-03-09 DIAGNOSIS — S61.312D LACERATION OF RIGHT MIDDLE FINGER WITHOUT FOREIGN BODY WITH DAMAGE TO NAIL, SUBSEQUENT ENCOUNTER: ICD-10-CM

## 2018-03-09 DIAGNOSIS — S62.632D DISPLACED FRACTURE OF DISTAL PHALANX OF RIGHT MIDDLE FINGER, SUBSEQUENT ENCOUNTER FOR FRACTURE WITH ROUTINE HEALING: ICD-10-CM

## 2018-03-09 PROCEDURE — 99212 OFFICE O/P EST SF 10 MIN: CPT | Performed by: PREVENTIVE MEDICINE

## 2018-03-09 ASSESSMENT — PAIN SCALES - GENERAL: PAINLEVEL: NO PAIN

## 2018-03-09 NOTE — PROGRESS NOTES
"Subjective:      Johan Vázquez is a 52 y.o. male who presents with Follow-Up (WC DOI 1/15/2018 - R FINGER - Better- room 25)      DOI 1/15/2018. Mechanism of injury-shut door on tip of right long finger. 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 He is doing well. No pain.     HPI    ROS  PFSH:  WORK STATUS: Full duty  PMH:  has a past medical history of Genital herpes.  MEDS:   Current Outpatient Prescriptions:   •  amoxicillin-clavulanate (AUGMENTIN) 875-125 MG Tab, Take 1 Tab by mouth 2 times a day., Disp: 20 Quantity Sufficient, Rfl: 0  •  Celecoxib (CELEBREX PO), Take  by mouth., Disp: , Rfl:   •  ketorolac (TORADOL) 10 MG Tab, Take 1 Tab by mouth every 6 hours as needed for Mild Pain for up to 22 doses., Disp: 22 Tab, Rfl: 2  •  valacyclovir (VALTREX) 1 GM Tab, Take 1 Tab by mouth every day., Disp: 30 Tab, Rfl: 6       Objective:     /84   Pulse 66   Temp 36.9 °C (98.4 °F)   Resp 12   Ht 1.753 m (5' 9\")   Wt 104.3 kg (230 lb)   SpO2 98%   BMI 33.97 kg/m²      Physical Exam    Right long finger shows improvement. Nailplate healing. IP joint function normal.       Assessment/Plan:     1. Laceration of right middle finger without foreign body with damage to nail, subsequent encounter  2. Displaced fracture of distal phalanx of right middle finger, subsequent encounter for fracture with routine healing  Condition improved  Regular work  No impairment  Release from care      "

## 2018-03-09 NOTE — LETTER
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  Diagnosis: Diagnoses of Laceration of right middle finger without foreign body with damage to nail, subsequent encounter and Displaced fracture of distal phalanx of right middle finger, subsequent encounter for fracture with routine healing were pertinent to this visit.    Medical Information   Related to Industrial Injury? Yes    Subjective Complaints:  DOI 1/15/2018. Mechanism of injury-shut door on tip of right long finger. 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 He is doing well. No pain.   Objective Findings: Right long finger shows improvement. Nailplate healing. IP joint function normal.   Pre-Existing Condition(s):     Assessment:   Condition Improved    Status: Discharged /  MMI  Permanent Disability:No    Plan:      Diagnostics:      Comments:       Disability Information   Status: Released to Full Duty    From:  3/9/2018  Through:   Restrictions are:     Physical Restrictions   Sitting:    Standing:    Stooping:    Bending:      Squatting:    Walking:    Climbing:    Pushing:      Pulling:    Other:    Reaching Above Shoulder (L):   Reaching Above Shoulder (R):       Reaching Below Shoulder (L):    Reaching Below Shoulder (R):      Not to exceed Weight Limits   Carrying(hrs):   Weight Limit(lb):   Lifting(hrs):   Weight  Limit(lb):     Comments:      Repetitive Actions   Hands: i.e. Fine Manipulations from Grasping:     Feet: i.e.  Operating Foot Controls:     Driving / Operate Machinery:     Physician Name: Main Dos Santos M.D. Physician Signature: MAIN Camp M.D. e-Signature: Dr. López Lopez, Medical Director   Clinic Name / Location: [address],   [address]  [place] [address] Clinic Phone Number: Dept: [phone]   Appointment Time: 9:30 Am Visit Start Time: 9:23 AM   Check-In Time:  9:20 Am Visit Discharge Time:  9:45 AM    Original-Treating Physician or Chiropractor    Page 2-Insurer/TPA    Page 3-Employer    Page 4-Employee

## 2018-03-20 ENCOUNTER — NON-PROVIDER VISIT (OUTPATIENT)
Dept: OCCUPATIONAL MEDICINE | Facility: CLINIC | Age: 53
End: 2018-03-20

## 2018-03-20 ENCOUNTER — OFFICE VISIT (OUTPATIENT)
Dept: OCCUPATIONAL MEDICINE | Facility: CLINIC | Age: 53
End: 2018-03-20
Payer: COMMERCIAL

## 2018-03-20 DIAGNOSIS — Z02.89 ENCOUNTER FOR OCCUPATIONAL HEALTH EXAMINATION: ICD-10-CM

## 2018-03-20 PROCEDURE — 92553 AUDIOMETRY AIR & BONE: CPT | Performed by: PREVENTIVE MEDICINE

## 2018-03-20 PROCEDURE — 8919 PR LIMITED CLEARANCE: Performed by: PREVENTIVE MEDICINE

## 2018-11-07 ENCOUNTER — NON-PROVIDER VISIT (OUTPATIENT)
Dept: OCCUPATIONAL MEDICINE | Facility: CLINIC | Age: 53
End: 2018-11-07

## 2018-11-07 DIAGNOSIS — Z02.83 ENCOUNTER FOR DRUG SCREENING: ICD-10-CM

## 2018-11-07 LAB
BREATH ALCOHOL COMMENT: NORMAL
POC BREATHALIZER: 0 PERCENT (ref 0–0.01)

## 2018-11-07 PROCEDURE — 82075 ASSAY OF BREATH ETHANOL: CPT | Performed by: PREVENTIVE MEDICINE

## 2018-11-07 PROCEDURE — 80305 DRUG TEST PRSMV DIR OPT OBS: CPT | Performed by: PREVENTIVE MEDICINE

## 2018-12-01 ENCOUNTER — APPOINTMENT (OUTPATIENT)
Dept: RADIOLOGY | Facility: MEDICAL CENTER | Age: 53
End: 2018-12-01
Attending: ORTHOPAEDIC SURGERY
Payer: COMMERCIAL

## 2018-12-04 ENCOUNTER — APPOINTMENT (RX ONLY)
Dept: URBAN - METROPOLITAN AREA CLINIC 22 | Facility: CLINIC | Age: 53
Setting detail: DERMATOLOGY
End: 2018-12-04

## 2018-12-04 DIAGNOSIS — L81.4 OTHER MELANIN HYPERPIGMENTATION: ICD-10-CM

## 2018-12-04 DIAGNOSIS — L57.8 OTHER SKIN CHANGES DUE TO CHRONIC EXPOSURE TO NONIONIZING RADIATION: ICD-10-CM

## 2018-12-04 DIAGNOSIS — L91.8 OTHER HYPERTROPHIC DISORDERS OF THE SKIN: ICD-10-CM

## 2018-12-04 DIAGNOSIS — D18.0 HEMANGIOMA: ICD-10-CM

## 2018-12-04 DIAGNOSIS — Z71.89 OTHER SPECIFIED COUNSELING: ICD-10-CM

## 2018-12-04 DIAGNOSIS — L82.1 OTHER SEBORRHEIC KERATOSIS: ICD-10-CM

## 2018-12-04 DIAGNOSIS — D22 MELANOCYTIC NEVI: ICD-10-CM

## 2018-12-04 DIAGNOSIS — L72.8 OTHER FOLLICULAR CYSTS OF THE SKIN AND SUBCUTANEOUS TISSUE: ICD-10-CM

## 2018-12-04 PROBLEM — D18.01 HEMANGIOMA OF SKIN AND SUBCUTANEOUS TISSUE: Status: ACTIVE | Noted: 2018-12-04

## 2018-12-04 PROBLEM — D22.5 MELANOCYTIC NEVI OF TRUNK: Status: ACTIVE | Noted: 2018-12-04

## 2018-12-04 PROCEDURE — 99203 OFFICE O/P NEW LOW 30 MIN: CPT | Mod: 25

## 2018-12-04 PROCEDURE — 17110 DESTRUCTION B9 LES UP TO 14: CPT

## 2018-12-04 PROCEDURE — ? BENIGN DESTRUCTION

## 2018-12-04 PROCEDURE — ? COUNSELING

## 2018-12-04 ASSESSMENT — LOCATION DETAILED DESCRIPTION DERM
LOCATION DETAILED: MID POSTERIOR NECK
LOCATION DETAILED: RIGHT CLAVICULAR NECK
LOCATION DETAILED: LEFT SUPERIOR MEDIAL UPPER BACK
LOCATION DETAILED: LEFT INFERIOR LATERAL NECK
LOCATION DETAILED: LEFT SUPERIOR ANTERIOR NECK
LOCATION DETAILED: RIGHT SUPERIOR MEDIAL MIDBACK
LOCATION DETAILED: EPIGASTRIC SKIN
LOCATION DETAILED: RIGHT INFERIOR POSTERIOR NECK
LOCATION DETAILED: RIGHT INFERIOR LATERAL NECK

## 2018-12-04 ASSESSMENT — LOCATION SIMPLE DESCRIPTION DERM
LOCATION SIMPLE: POSTERIOR NECK
LOCATION SIMPLE: ABDOMEN
LOCATION SIMPLE: RIGHT LOWER BACK
LOCATION SIMPLE: LEFT UPPER BACK
LOCATION SIMPLE: RIGHT ANTERIOR NECK
LOCATION SIMPLE: LEFT ANTERIOR NECK

## 2018-12-04 ASSESSMENT — LOCATION ZONE DERM
LOCATION ZONE: TRUNK
LOCATION ZONE: NECK

## 2018-12-04 NOTE — PROCEDURE: BENIGN DESTRUCTION
Add 52 Modifier (Optional): no
Anesthesia Volume In Cc: 0.5
Total Number Of Lesions Treated: 5
Consent: The patient's consent was obtained including but not limited to risks of crusting, scabbing, blistering, scarring, darker or lighter pigmentary change, recurrence, incomplete removal and infection.
Detail Level: Zone
Bill Insurance (You Assume Risk Of Denial Or Audit By Selecting Yes): Yes
Medical Necessity Information: It is in your best interest to select a reason for this procedure from the list below. All of these items fulfill various CMS LCD requirements except the new and changing color options.
Medical Necessity Clause: This procedure was medically necessary because the lesions that were treated were:
Post-Care Instructions: I reviewed with the patient in detail post-care instructions. Patient is to wear sunprotection, and avoid picking at any of the treated lesions. Pt may apply Vaseline to crusted or scabbing areas.

## 2018-12-29 ENCOUNTER — HOSPITAL ENCOUNTER (OUTPATIENT)
Dept: RADIOLOGY | Facility: MEDICAL CENTER | Age: 53
End: 2018-12-29
Attending: ORTHOPAEDIC SURGERY
Payer: COMMERCIAL

## 2018-12-29 DIAGNOSIS — M25.552 LEFT HIP PAIN: ICD-10-CM

## 2018-12-29 DIAGNOSIS — M25.551 RIGHT HIP PAIN: ICD-10-CM

## 2018-12-29 PROCEDURE — 73721 MRI JNT OF LWR EXTRE W/O DYE: CPT | Mod: RT

## 2018-12-29 PROCEDURE — 73721 MRI JNT OF LWR EXTRE W/O DYE: CPT | Mod: LT

## 2019-02-25 ENCOUNTER — HOSPITAL ENCOUNTER (OUTPATIENT)
Dept: RADIOLOGY | Facility: MEDICAL CENTER | Age: 54
End: 2019-02-25
Attending: ORTHOPAEDIC SURGERY
Payer: COMMERCIAL

## 2019-02-25 DIAGNOSIS — M25.552 LEFT HIP PAIN: ICD-10-CM

## 2019-02-25 PROCEDURE — 20610 DRAIN/INJ JOINT/BURSA W/O US: CPT | Mod: LT

## 2019-02-25 PROCEDURE — 77002 NEEDLE LOCALIZATION BY XRAY: CPT

## 2019-02-25 PROCEDURE — 700101 HCHG RX REV CODE 250

## 2019-02-25 PROCEDURE — 700117 HCHG RX CONTRAST REV CODE 255: Performed by: ORTHOPAEDIC SURGERY

## 2019-02-25 RX ORDER — LIDOCAINE HYDROCHLORIDE 10 MG/ML
INJECTION, SOLUTION INFILTRATION; PERINEURAL
Status: DISPENSED
Start: 2019-02-25 | End: 2019-02-26

## 2019-02-25 RX ADMIN — IOHEXOL 5 ML: 300 INJECTION, SOLUTION INTRAVENOUS at 14:45

## 2019-10-09 ENCOUNTER — IMMUNIZATION (OUTPATIENT)
Dept: OCCUPATIONAL MEDICINE | Facility: CLINIC | Age: 54
End: 2019-10-09

## 2019-10-09 DIAGNOSIS — Z23 NEED FOR VACCINATION: ICD-10-CM

## 2019-10-09 PROCEDURE — 90686 IIV4 VACC NO PRSV 0.5 ML IM: CPT | Performed by: PREVENTIVE MEDICINE

## 2020-02-04 ENCOUNTER — APPOINTMENT (RX ONLY)
Dept: URBAN - METROPOLITAN AREA CLINIC 22 | Facility: CLINIC | Age: 55
Setting detail: DERMATOLOGY
End: 2020-02-04

## 2020-02-04 DIAGNOSIS — L82.1 OTHER SEBORRHEIC KERATOSIS: ICD-10-CM

## 2020-02-04 DIAGNOSIS — L81.4 OTHER MELANIN HYPERPIGMENTATION: ICD-10-CM

## 2020-02-04 DIAGNOSIS — D18.0 HEMANGIOMA: ICD-10-CM

## 2020-02-04 DIAGNOSIS — Z71.89 OTHER SPECIFIED COUNSELING: ICD-10-CM

## 2020-02-04 DIAGNOSIS — D22 MELANOCYTIC NEVI: ICD-10-CM

## 2020-02-04 PROBLEM — D48.5 NEOPLASM OF UNCERTAIN BEHAVIOR OF SKIN: Status: ACTIVE | Noted: 2020-02-04

## 2020-02-04 PROBLEM — D22.5 MELANOCYTIC NEVI OF TRUNK: Status: ACTIVE | Noted: 2020-02-04

## 2020-02-04 PROBLEM — D18.01 HEMANGIOMA OF SKIN AND SUBCUTANEOUS TISSUE: Status: ACTIVE | Noted: 2020-02-04

## 2020-02-04 PROCEDURE — ? COUNSELING

## 2020-02-04 PROCEDURE — 11102 TANGNTL BX SKIN SINGLE LES: CPT

## 2020-02-04 PROCEDURE — ? BIOPSY BY SHAVE METHOD

## 2020-02-04 PROCEDURE — 99214 OFFICE O/P EST MOD 30 MIN: CPT | Mod: 25

## 2020-02-04 ASSESSMENT — LOCATION DETAILED DESCRIPTION DERM
LOCATION DETAILED: RIGHT SUPERIOR MEDIAL MIDBACK
LOCATION DETAILED: EPIGASTRIC SKIN
LOCATION DETAILED: INFERIOR THORACIC SPINE
LOCATION DETAILED: LEFT SUPERIOR MEDIAL UPPER BACK

## 2020-02-04 ASSESSMENT — LOCATION SIMPLE DESCRIPTION DERM
LOCATION SIMPLE: ABDOMEN
LOCATION SIMPLE: LEFT UPPER BACK
LOCATION SIMPLE: RIGHT LOWER BACK
LOCATION SIMPLE: UPPER BACK

## 2020-02-04 ASSESSMENT — LOCATION ZONE DERM: LOCATION ZONE: TRUNK

## 2020-02-04 NOTE — PROCEDURE: BIOPSY BY SHAVE METHOD
Detail Level: Detailed
Depth Of Biopsy: dermis
Was A Bandage Applied: Yes
Size Of Lesion In Cm: 0
Biopsy Type: H and E
Biopsy Method: Personna blade
Anesthesia Type: 1% lidocaine with 1:100,000 epinephrine and a 1:3 solution of 8.4% sodium bicarbonate
Anesthesia Volume In Cc: 1
Hemostasis: Drysol
Wound Care: Vaseline
Dressing: bandage
Destruction After The Procedure: No
Type Of Destruction Used: Curettage
Curettage Text: The wound bed was treated with curettage after the biopsy was performed.
Cryotherapy Text: The wound bed was treated with cryotherapy after the biopsy was performed.
Electrodesiccation Text: The wound bed was treated with electrodesiccation after the biopsy was performed.
Electrodesiccation And Curettage Text: The wound bed was treated with electrodesiccation and curettage after the biopsy was performed.
Silver Nitrate Text: The wound bed was treated with silver nitrate after the biopsy was performed.
Lab: [id number]
Lab Facility: 
Consent: Written consent was obtained and risks were reviewed including but not limited to scarring, infection, bleeding, scabbing, incomplete removal, nerve damage and allergy to anesthesia.
Post-Care Instructions: I reviewed with the patient in detail post-care instructions. Patient is to keep the biopsy site dry overnight, and then apply bacitracin twice daily until healed. Patient may apply hydrogen peroxide soaks to remove any crusting.
Notification Instructions: Patient will be notified of biopsy results. However, patient instructed to call the office if not contacted within 2 weeks.
Billing Type: Third-Party Bill

## 2020-04-21 ENCOUNTER — NON-PROVIDER VISIT (OUTPATIENT)
Dept: URGENT CARE | Facility: PHYSICIAN GROUP | Age: 55
End: 2020-04-21

## 2020-04-21 DIAGNOSIS — Z02.83 ENCOUNTER FOR DRUG SCREENING: Primary | ICD-10-CM

## 2020-04-21 LAB
BREATH ALCOHOL COMMENT: NORMAL
POC BREATHALIZER: 0 PERCENT (ref 0–0.01)

## 2020-04-21 PROCEDURE — 8907 PR URINE COLLECT ONLY: Performed by: PHYSICIAN ASSISTANT

## 2020-04-21 PROCEDURE — 82075 ASSAY OF BREATH ETHANOL: CPT | Performed by: PHYSICIAN ASSISTANT
